# Patient Record
Sex: FEMALE | NOT HISPANIC OR LATINO | Employment: OTHER | URBAN - METROPOLITAN AREA
[De-identification: names, ages, dates, MRNs, and addresses within clinical notes are randomized per-mention and may not be internally consistent; named-entity substitution may affect disease eponyms.]

---

## 2019-04-01 ENCOUNTER — OFFICE VISIT (OUTPATIENT)
Dept: URGENT CARE | Facility: PHYSICIAN GROUP | Age: 64
End: 2019-04-01

## 2019-04-01 ENCOUNTER — HOSPITAL ENCOUNTER (OUTPATIENT)
Dept: RADIOLOGY | Facility: MEDICAL CENTER | Age: 64
End: 2019-04-01
Attending: NURSE PRACTITIONER

## 2019-04-01 VITALS
RESPIRATION RATE: 14 BRPM | SYSTOLIC BLOOD PRESSURE: 170 MMHG | BODY MASS INDEX: 28.85 KG/M2 | OXYGEN SATURATION: 99 % | WEIGHT: 169 LBS | TEMPERATURE: 99 F | DIASTOLIC BLOOD PRESSURE: 110 MMHG | HEIGHT: 64 IN | HEART RATE: 78 BPM

## 2019-04-01 DIAGNOSIS — R03.0 ELEVATED BLOOD PRESSURE READING WITHOUT DIAGNOSIS OF HYPERTENSION: ICD-10-CM

## 2019-04-01 DIAGNOSIS — R05.9 COUGH: ICD-10-CM

## 2019-04-01 DIAGNOSIS — J40 BRONCHITIS: Primary | ICD-10-CM

## 2019-04-01 PROCEDURE — 99204 OFFICE O/P NEW MOD 45 MIN: CPT | Performed by: NURSE PRACTITIONER

## 2019-04-01 PROCEDURE — 71046 X-RAY EXAM CHEST 2 VIEWS: CPT

## 2019-04-01 RX ORDER — AZITHROMYCIN 250 MG/1
TABLET, FILM COATED ORAL
COMMUNITY
Start: 2019-03-29

## 2019-04-01 RX ORDER — ALBUTEROL SULFATE 90 UG/1
AEROSOL, METERED RESPIRATORY (INHALATION)
COMMUNITY
Start: 2019-03-29 | End: 2020-03-28

## 2019-04-01 RX ORDER — AMOXICILLIN 500 MG/1
1000 TABLET, FILM COATED ORAL
COMMUNITY
Start: 2019-03-29 | End: 2019-04-03

## 2019-04-01 RX ORDER — BENZONATATE 200 MG/1
200 CAPSULE ORAL EVERY 8 HOURS PRN
Qty: 21 CAP | Refills: 0 | Status: SHIPPED | OUTPATIENT
Start: 2019-04-01

## 2019-04-01 ASSESSMENT — ENCOUNTER SYMPTOMS
COUGH: 1
WHEEZING: 0
SHORTNESS OF BREATH: 0
FOCAL WEAKNESS: 0
NEUROLOGICAL NEGATIVE: 1
CARDIOVASCULAR NEGATIVE: 1
CONSTITUTIONAL NEGATIVE: 1
DIZZINESS: 0
EYES NEGATIVE: 1
SENSORY CHANGE: 0
TINGLING: 0
FEVER: 0
BLURRED VISION: 0
SORE THROAT: 0
WEAKNESS: 0

## 2019-04-01 NOTE — PROGRESS NOTES
Subjective:     Hyacinth Clancy is a 63 y.o. female who presents for Cough (sob)        Cough    This is a new problem. Episode onset: 1-2 weeks ago.  The problem has been gradually worsening. Pertinent negatives include no chest pain, fever, sore throat, shortness of breath or wheezing.   Patient seen at the [facility] on 3/29/2019 for a cough that started 9 days prior to that. She was noted to have decreased breath sounds and wheezing in the left lower lung fields and was diagnosed with community-acquired pneumonia and started on azithromycin and amoxicillin as well as albuterol inhaler. The patient returned to the [facility] today for a follow-up and was noted not to be improved. She was referred to urgent care for further evaluation and a chest x-ray.    PMH:  has no past medical history on file.    MEDS:   Current Outpatient Prescriptions:   •  albuterol (VENTOLIN HFA) 108 (90 Base) MCG/ACT Aero Soln inhalation aerosol, 1-2 inhalations every 4-6 hours as needed for wheezing. Dispense spacer as needed., Disp: , Rfl:   •  Amoxicillin 500 MG Tab, Take 1,000 mg by mouth., Disp: , Rfl:   •  azithromycin (ZITHROMAX) 250 MG Tab, Take two tablets by mouth once on Day 1, then take one tablet by mouth daily on Days 2-5., Disp: , Rfl:   •  benzonatate (TESSALON) 200 MG capsule, Take 1 Cap by mouth every 8 hours as needed for Cough., Disp: 21 Cap, Rfl: 0    ALLERGIES: Not on File    SURGHX: No past surgical history on file.    SOCHX:  reports that she has never smoked. She has never used smokeless tobacco. She reports that she does not drink alcohol.     FH: Reviewed with patient, not pertinent to this visit.     Review of Systems   Constitutional: Negative.  Negative for fever and malaise/fatigue.   HENT: Negative for congestion and sore throat.    Eyes: Negative.  Negative for blurred vision.   Respiratory: Positive for cough. Negative for shortness of breath and wheezing.    Cardiovascular: Negative.  Negative  "for chest pain.   Neurological: Negative.  Negative for dizziness, tingling, sensory change, focal weakness and weakness.   All other systems reviewed and are negative.    Objective:     BP (!) 170/110   Pulse 78   Temp 37.2 °C (99 °F) (Temporal)   Resp 14   Ht 1.626 m (5' 4\")   Wt 76.7 kg (169 lb)   SpO2 99%   BMI 29.01 kg/m²      Physical Exam   Constitutional: She is oriented to person, place, and time. She appears well-developed and well-nourished. She is cooperative.  Non-toxic appearance. No distress.   HENT:   Head: Normocephalic and atraumatic.   Right Ear: External ear normal.   Left Ear: External ear normal.   Nose: Nose normal.   Mouth/Throat: Uvula is midline, oropharynx is clear and moist and mucous membranes are normal.   Bilateral auditory canals obscured with cerumen   Eyes: Pupils are equal, round, and reactive to light. Conjunctivae and EOM are normal.   Neck: Normal range of motion.   Cardiovascular: Normal rate, regular rhythm, normal heart sounds and normal pulses.    Pulmonary/Chest: Effort normal. No respiratory distress. She has decreased breath sounds in the left lower field. She has no wheezes.   Abdominal: Bowel sounds are normal.   Musculoskeletal: Normal range of motion. She exhibits no edema or deformity.   Lymphadenopathy:     She has no cervical adenopathy.   Neurological: She is alert and oriented to person, place, and time. She has normal strength. No sensory deficit.   Skin: Skin is warm, dry and intact. Capillary refill takes less than 2 seconds.   Psychiatric: She has a normal mood and affect. Her behavior is normal.   Vitals reviewed.    Chest x-ray:    Narrative     4/1/2019 10:42 AM    HISTORY/REASON FOR EXAM:  Cough    TECHNIQUE/EXAM DESCRIPTION:  PA and lateral views of the chest.    COMPARISON:  None    FINDINGS:    The heart is mildly enlarged. No focal consolidation, pleural effusion or pneumothorax is identified.  Costophrenic angles are clear. Degenerative " changes are seen in the spine.     Impression     Mild cardiomegaly.     Reading Provider Reading Date   Nicol Quintana M.D. Apr 1, 2019     Signing Provider Signing Date Signing Time   Nicol Quintana M.D. Apr 1, 2019 11:15 AM        Assessment/Plan:     1. Bronchitis  - benzonatate (TESSALON) 200 MG capsule; Take 1 Cap by mouth every 8 hours as needed for Cough.  Dispense: 21 Cap; Refill: 0  - REFERRAL TO FAMILY PRACTICE    2. Elevated blood pressure reading without diagnosis of hypertension  - REFERRAL TO FAMILY PRACTICE    3. Cough  - DX-CHEST-2 VIEWS; Future    Chest x-ray report of clear lungs and mild cardiomegaly. Patient denies a past personal history of cardiac disease. Rechecked patient's blood pressure: 170/110. Patient currently denying other symptoms including chest pain, shortness of breath, dizziness, lightheadedness, vision changes, weakness, sensory changes. Patient advised to follow-up with PCP. Referral given for family practice. Patient advised to continue monitoring for changes in symptoms. Warning signs reviewed.    Patient advised to continue with current antibiotics, she has a few days remaining. Rx sent for Tessalon Perles to help with cough. Discussed supportive measures including increasing fluids and rest as well as OTC symptom management including acetaminophen and/or ibuprofen PRN pain and/or fever.    Patient advised to: Return for 1) Symptoms don't improve or worsen, or go to ER, 2) Follow up with primary care in 7-10 days.    Differential diagnosis, natural history, supportive care, and indications for immediate follow-up discussed. All questions answered. Patient agrees with the plan of care.

## 2021-03-03 DIAGNOSIS — Z23 NEED FOR VACCINATION: ICD-10-CM

## 2023-09-06 ENCOUNTER — OFFICE VISIT (OUTPATIENT)
Dept: URGENT CARE | Facility: PHYSICIAN GROUP | Age: 68
End: 2023-09-06
Payer: COMMERCIAL

## 2023-09-06 VITALS
SYSTOLIC BLOOD PRESSURE: 126 MMHG | WEIGHT: 165 LBS | HEIGHT: 64 IN | TEMPERATURE: 98 F | DIASTOLIC BLOOD PRESSURE: 84 MMHG | OXYGEN SATURATION: 98 % | BODY MASS INDEX: 28.17 KG/M2 | HEART RATE: 74 BPM | RESPIRATION RATE: 16 BRPM

## 2023-09-06 DIAGNOSIS — H92.01 OTALGIA, RIGHT: ICD-10-CM

## 2023-09-06 DIAGNOSIS — H61.21 IMPACTED CERUMEN OF RIGHT EAR: ICD-10-CM

## 2023-09-06 PROCEDURE — 3074F SYST BP LT 130 MM HG: CPT | Performed by: PHYSICIAN ASSISTANT

## 2023-09-06 PROCEDURE — 99203 OFFICE O/P NEW LOW 30 MIN: CPT | Performed by: PHYSICIAN ASSISTANT

## 2023-09-06 PROCEDURE — 3079F DIAST BP 80-89 MM HG: CPT | Performed by: PHYSICIAN ASSISTANT

## 2023-09-06 RX ORDER — LISINOPRIL 40 MG/1
40 TABLET ORAL DAILY
COMMUNITY

## 2023-09-06 RX ORDER — OFLOXACIN 3 MG/ML
5 SOLUTION AURICULAR (OTIC) DAILY
Qty: 5 ML | Refills: 0 | Status: SHIPPED | OUTPATIENT
Start: 2023-09-06 | End: 2023-09-13

## 2023-09-06 RX ORDER — METOPROLOL SUCCINATE 100 MG/1
100 TABLET, EXTENDED RELEASE ORAL DAILY
COMMUNITY
Start: 2023-07-06

## 2023-09-06 RX ORDER — FLECAINIDE ACETATE 50 MG/1
50 TABLET ORAL EVERY 12 HOURS
COMMUNITY
Start: 2023-07-30

## 2023-09-06 ASSESSMENT — ENCOUNTER SYMPTOMS
FEVER: 0
CHILLS: 0
SORE THROAT: 0
HEADACHES: 0
DIZZINESS: 0
SINUS PAIN: 0
COUGH: 0

## 2023-09-06 NOTE — PROGRESS NOTES
"Subjective:     CHIEF COMPLAINT  Chief Complaint   Patient presents with    Otalgia     Rt ear, with discharge x1d       HPI  Hyacinth Clancy is a very pleasant 67 y.o. female who presents to the clinic with right ear pain x1 day.  Patient describes experiencing a sharp stabbing pain of her right ear last night.  She then noted small amounts of blood coming from her ear canal.  Denies any preceding trauma.  Denies any Q-tip use.  No recent illness.  She has been noting intermittent tendinitis over the last few days.  Denies any fevers or chills.    REVIEW OF SYSTEMS  Review of Systems   Constitutional:  Negative for chills, fever and malaise/fatigue.   HENT:  Positive for ear discharge, ear pain and tinnitus. Negative for congestion, sinus pain and sore throat.    Respiratory:  Negative for cough.    Skin:  Negative for rash.   Neurological:  Negative for dizziness and headaches.       PAST MEDICAL HISTORY  There are no problems to display for this patient.      SURGICAL HISTORY  patient denies any surgical history    ALLERGIES  No Known Allergies    CURRENT MEDICATIONS  Home Medications       Reviewed by Danis Adorno P.A.-C. (Physician Assistant) on 09/06/23 at 1233  Med List Status: <None>     Medication Last Dose Status   azithromycin (ZITHROMAX) 250 MG Tab Not Taking Active   benzonatate (TESSALON) 200 MG capsule Not Taking Active   flecainide (TAMBOCOR) 50 MG tablet Taking Active   lisinopril (PRINIVIL) 40 MG tablet Taking Active   metoprolol SR (TOPROL XL) 100 MG TABLET SR 24 HR Taking Active                    SOCIAL HISTORY  Social History     Tobacco Use    Smoking status: Never    Smokeless tobacco: Never   Substance and Sexual Activity    Alcohol use: No    Drug use: Not on file    Sexual activity: Not on file       FAMILY HISTORY  History reviewed. No pertinent family history.       Objective:     VITAL SIGNS: /84   Pulse 74   Temp 36.7 °C (98 °F) (Temporal)   Resp 16   Ht 1.626 m (5' 4\")  "  Wt 74.8 kg (165 lb)   SpO2 98%   BMI 28.32 kg/m²     PHYSICAL EXAM  Physical Exam  Constitutional:       Appearance: Normal appearance.   HENT:      Head: Normocephalic and atraumatic.      Right Ear: There is impacted cerumen.      Left Ear: There is no impacted cerumen.      Ears:      Comments: Cerumen impaction to the right auditory canal.  Cerumen is slightly blood-tinged.  Unable to visualize TM.  No mastoid tenderness.  No tenderness to movement of the tragus.     Nose: Nose normal.      Mouth/Throat:      Mouth: Mucous membranes are moist.   Eyes:      Conjunctiva/sclera: Conjunctivae normal.   Cardiovascular:      Rate and Rhythm: Normal rate and regular rhythm.      Pulses: Normal pulses.      Heart sounds: Normal heart sounds.   Pulmonary:      Effort: Pulmonary effort is normal.      Breath sounds: No wheezing.   Musculoskeletal:      Cervical back: Normal range of motion.   Neurological:      Mental Status: She is alert.         Assessment/Plan:     1. Impacted cerumen of right ear        2. Otalgia, right  ofloxacin otic sol (FLOXIN OTIC) 0.3 % Solution          MDM/Comments:    Patient has been experiencing right ear pain since last night.  Noted a sharp pain then developed small amounts of bleeding out of the right auditory canal.  No preceding illness.  On exam I was able to remove moderate cerumen from the canal using a curette.  Cerumen is slightly blood-tinged.  Elected to avoid ear lavage due to potential TM perforation that I am unable to visualize at this time.  We elected to begin coverage with ofloxacin for potential otitis externa.  No abrasion visualized in the canal.  Return precautions discussed.  Discussed performing an ear lavage at a later date after potential TM perforation has healed.    Differential diagnosis, natural history, supportive care, and indications for immediate follow-up discussed. All questions answered. Patient agrees with the plan of care.    Follow-up as needed  if symptoms worsen or fail to improve to PCP, Urgent care or Emergency Room.    I have personally reviewed prior external notes and test results pertinent to today's visit.  I have independently reviewed and interpreted all diagnostics ordered during this urgent care acute visit.   Discussed management options (risks,benefits, and alternatives to treatment). Pt expresses understanding and the treatment plan was agreed upon. Questions were encouraged and answered to pt's satisfaction.    Please note that this dictation was created using voice recognition software. I have made a reasonable attempt to correct obvious errors, but I expect that there are errors of grammar and possibly content that I did not discover before finalizing the note.